# Patient Record
Sex: MALE | Race: WHITE | NOT HISPANIC OR LATINO | Employment: FULL TIME | URBAN - METROPOLITAN AREA
[De-identification: names, ages, dates, MRNs, and addresses within clinical notes are randomized per-mention and may not be internally consistent; named-entity substitution may affect disease eponyms.]

---

## 2021-10-28 ENCOUNTER — NURSE TRIAGE (OUTPATIENT)
Dept: NURSING | Facility: CLINIC | Age: 46
End: 2021-10-28

## 2021-10-28 ENCOUNTER — OFFICE VISIT (OUTPATIENT)
Dept: URGENT CARE | Facility: URGENT CARE | Age: 46
End: 2021-10-28
Payer: COMMERCIAL

## 2021-10-28 VITALS
HEART RATE: 73 BPM | TEMPERATURE: 98 F | DIASTOLIC BLOOD PRESSURE: 102 MMHG | SYSTOLIC BLOOD PRESSURE: 142 MMHG | OXYGEN SATURATION: 96 % | RESPIRATION RATE: 20 BRPM | WEIGHT: 145 LBS | HEIGHT: 69 IN | BODY MASS INDEX: 21.48 KG/M2

## 2021-10-28 DIAGNOSIS — M54.12 CERVICAL RADICULOPATHY: Primary | ICD-10-CM

## 2021-10-28 PROCEDURE — 99204 OFFICE O/P NEW MOD 45 MIN: CPT | Performed by: FAMILY MEDICINE

## 2021-10-28 RX ORDER — METHYLPREDNISOLONE 4 MG
TABLET, DOSE PACK ORAL
Qty: 21 TABLET | Refills: 0 | Status: SHIPPED | OUTPATIENT
Start: 2021-10-28 | End: 2022-08-17

## 2021-10-28 RX ORDER — CYCLOBENZAPRINE HCL 5 MG
5 TABLET ORAL
Qty: 30 TABLET | Refills: 0 | Status: SHIPPED | OUTPATIENT
Start: 2021-10-28 | End: 2022-08-17

## 2021-10-28 RX ORDER — IBUPROFEN 800 MG/1
800 TABLET, FILM COATED ORAL EVERY 8 HOURS PRN
Qty: 30 TABLET | Refills: 0 | Status: SHIPPED | OUTPATIENT
Start: 2021-10-28 | End: 2022-08-17

## 2021-10-28 ASSESSMENT — MIFFLIN-ST. JEOR: SCORE: 1538.1

## 2021-10-28 NOTE — LETTER
[facility] URGENT CARE [place]  [address] [place] [address]  Phone: [phone]    October 28, 2021        Teodora CARROLL Sanjuanamateobobdebbie  [address] [facility] [address]          To whom it may concern:    RE: Teodora Sanchez    Patient was seen and treated today at our clinic. Please excuse starting today and return to work tomorrow, 10/29/21.     Please contact me for questions or concerns.      Sincerely,        Deb Hilliard MD

## 2021-10-28 NOTE — LETTER
[facility] URGENT CARE [place]  [address] [place] [address]  Phone: [phone]    October 28, 2021        Teodora Sanchez  [address] [facility] [address]          To whom it may concern:    RE: Teodora Sanchez    Patient was seen and treated today at our clinic and missed work today.  Please excuse from work today.  He can return to work tomorrow, 10/29/21.     Please contact me for questions or concerns.      Sincerely,        Deb Hilliard MD

## 2021-10-28 NOTE — LETTER
[facility] URGENT CARE [place]  [address] [place] [address]  Phone: [phone]    October 28, 2021        Teodora Sanchez  [address] [facility] [address]          To whom it may concern:    RE: Teodora Sanchez    Patient was seen and treated today at our clinic. Please excuse from work on 10/29/21.  He can return to work on 10/30/21.      Please contact me for questions or concerns.      Sincerely,        Deb Hilliard MD

## 2021-10-28 NOTE — PATIENT INSTRUCTIONS
Start medrol does pack first dose now    Start cyclobenzaprine -muscle relaxant at bedtime will make you sleepy    For pain    Start  Ibuprofen (motrin/advil)  800 mg 3 times a day always take with food for the next 5 to 7 days until pain resolves-maximum dose of ibuprofen is 2400 mg in 24 hours     Can alternate with     Acetaminophen (Tylenol ) 1000 mg 3 times a day for the next 5 to 7 days until pain resolves-maximum dose of acetaminophen (tylenol)  is 3000 mg in 24-hours    Patient Education     Understanding Cervical Radiculopathy    Cervical radiculopathy is irritation or inflammation of a nerve root in the neck. It causes neck pain and other symptoms that may spread into the chest or down the arm. To understand this condition, it helps to understand the parts of the spine:    Vertebrae. These are bones that stack to form the spine. The cervical spine contains the 7 vertebrae in the neck.    Disks. These are soft pads of tissue between the vertebrae. They act as shock absorbers for the spine.    The spinal canal. This is a tunnel formed within the stacked vertebrae. The spinal cord runs through this canal.    Nerves. These branch off the spinal cord. As they leave the spinal canal, nerves pass through openings between the vertebrae. The nerve root is the part of the nerve that is closest to the spinal cord.   With cervical radiculopathy, nerve roots in the neck become irritated. This leads to pain and symptoms that can travel to the nerves that go from the spinal cord down the arms and into the torso.  What causes cervical radiculopathy?  Aging, injury, poor posture, and other issues can lead to problems in the neck. These problems may then irritate nerve roots. These include:    Damage to a disk in the cervical spine. The damaged disk may then press on nearby nerve roots.    Degeneration from wear and tear, and aging. This can lead to narrowing (stenosis) of the openings between the vertebrae. The narrowed  openings press on nerve roots as they leave the spinal canal.    An unstable spine. This is when a vertebra slips forward. It can then press on a nerve root.  There are other, less common causes of pressure on nerves in the neck. These include infection, cysts, and tumors.  Symptoms of cervical radiculopathy  These include:    Neck pain    Pain, numbness, tingling, or weakness that travels down the arm    Loss of neck movement    Muscle spasms  Treatment for cervical radiculopathy  In most cases, your healthcare provider will first try treatments that help relieve symptoms. These may include:    Prescription or over-the-counter pain medicines. These help relieve pain and swelling.    Cold packs. These help reduce pain.    Resting. This involves avoiding positions and activities that increase pain.    Neck brace (cervical collar). This can help relieve inflammation and pain.    Physical therapy, including exercises and stretches. This can help decrease pain and increase movement and function.    Shots of medicinesaround the nerve roots. This is done to help relieve symptoms for a time.  In some cases, your healthcare provider may advise surgery to fix the underlying problem. This depends on the cause, the symptoms, and how long the pain has lasted.  Possible complications  Over time, an irritated and inflamed nerve may become damaged. This may lead to long-lasting (permanent) numbness or weakness. If symptoms change suddenly or get worse, be sure to let your healthcare provider know.     When to call your healthcare provider  Call your healthcare provider right away if you have any of these:    New pain or pain that gets worse    New or increasing weakness, numbness, or tingling in your arm or hand    Bowel or bladder changes   [employer] last reviewed this educational content on 3/10/2016    1673-1150 [employer]. All rights reserved. This information is not intended as a substitute for professional  medical care. Always follow your healthcare professional's instructions.

## 2021-10-28 NOTE — TELEPHONE ENCOUNTER
RN Triage:    Was seen at [facility] urgent care this afternoon for ongoing cervical neck pain.  3 medications were prescribed.  When he got to the pharmacy later they said they were closed due to inadequate staff.  Home care discussed.  He plans to monitor at home tonight, taking ibuprofen as prescribed.  He will go back to pharmacy tomorrow for medications.    Gayle Orozco RN 10/28/21 5:21 PM  [facility] Nurse Advisor

## 2021-10-28 NOTE — PROGRESS NOTES
Patient presents with:  Urgent Care: Neck and shoulder pain has gotten worse last 2 days but had it for months       Subjective     Teodora Sanchez is a 46 year old male who presents to clinic today for the following health issues:    HPI     Musculoskeletal problem/pain      Duration: on going chronic neck pain and radicular pain into right>left shoulder    Description   Location: onset tin this last year, consistent low level 2/10 pain and into r shoulder initially,  and then comes and goes/has flares of pain 5-6/10 in the last 2 weeks now pain into both shoulders, tingles down bilateral arms into fingers, comes and goes, no weakness in legs, no loss of bowel/bladder control  no history of MRI no    Intensity:  moderate    Accompanying signs and symptoms: radiation of pain shoulder and hands as noted , tingling and numbness in hands    History  Previous similar problem: YES- as noted   Previous evaluation: Followed by [facility] history of MRI  or other imaging, physical therapy and chiropractic care  in the past has a TENS unit as well    Precipitating or alleviating factors:  Trauma or overuse: NO   Aggravating factors include: lifting and movement of the neck  In bed stomach sleeper, unable to sleep causes pain     Therapies tried and outcome: heat, ice, acetaminophen, Ibuprofen and home exercise TENS unit         No past medical history on file.  Social History     Tobacco Use     Smoking status: Current Every Day Smoker     Smokeless tobacco: Never Used   Substance Use Topics     Alcohol use: Not on file       Current Outpatient Medications   Medication Sig Dispense Refill     cyclobenzaprine (FLEXERIL) 5 MG tablet Take 1 tablet (5 mg) by mouth at bedtime as needed, may repeat once for muscle spasms 30 tablet 0     ibuprofen (ADVIL/MOTRIN) 800 MG tablet Take 1 tablet (800 mg) by mouth every 8 hours as needed for moderate pain 30 tablet 0     methylPREDNISolone (MEDROL DOSEPAK) 4 MG tablet  "therapy pack Follow Package Directions 21 tablet 0     No Known Allergies          ROS are negative, except as otherwise noted HPI      Objective    BP (!) 142/102   Pulse 73   Temp 98  F (36.7  C) (Oral)   Resp 20   Ht 1.753 m (5' 9\")   Wt 65.8 kg (145 lb)   SpO2 96%   BMI 21.41 kg/m    Body mass index is 21.41 kg/m .  Physical Exam   GENERAL: alert and no distress  NECK: no adenopathy, no asymmetry, masses, or scars and thyroid normal to palpation  MS: neck exam: impaired movement of neck, tenderness at bilateral paraspinous muscles, and upper border of trapezius bilateral,  range of motion: flexion mildly reduced, extension mildly reduced due to pain  lateral rotation right mildly reduced, lateral rotation to left mildly reduced due to pain , normal neurological exam of arms; normal DTR's, motor, sensory exam  NEURO: Alert, oriented, speech and mentation normal, Cranial nerves 2-12 are normal., Strength normal and symmetrical in upper and lower extremities., Reflexes 2+ and symmetrical at biceps, triceps, brachioradialis, knee, Sensation is normal throughout, Gait normal       Diagnostic Test Results:  Labs reviewed in Epic  No results found for any visits on 10/28/21.        ASSESSMENT/PLAN:      ICD-10-CM    1. Cervical radiculopathy  M54.12 methylPREDNISolone (MEDROL DOSEPAK) 4 MG tablet therapy pack     cyclobenzaprine (FLEXERIL) 5 MG tablet     ibuprofen (ADVIL/MOTRIN) 800 MG tablet             Reviewed medication instructions and side effects. Follow up if experiences side effects.     I reviewed supportive care, otc meds to use if needed, expected course, and signs of concern.  Follow up as needed or if he does not improve within  1-2 days or if worsens in any way.  Reviewed red flag symptoms and is to go to the ER if experiences any of these.     The use of Dragon/[employer] dictation services may have been used to construct the content in this note; any grammatical or spelling errors are " non-intentional. Please contact the author of this note directly if you are in need of any clarification.        On the day of the encounter, time spend on chart review, patient visit, review of testing, documentation and discussion with other providers was 45  minutes        Patient Instructions       Start medrol does pack first dose now    Start cyclobenzaprine -muscle relaxant at bedtime will make you sleepy    For pain    Start  Ibuprofen (motrin/advil)  800 mg 3 times a day always take with food for the next 5 to 7 days until pain resolves-maximum dose of ibuprofen is 2400 mg in 24 hours     Can alternate with     Acetaminophen (Tylenol ) 1000 mg 3 times a day for the next 5 to 7 days until pain resolves-maximum dose of acetaminophen (tylenol)  is 3000 mg in 24-hours    Patient Education     Understanding Cervical Radiculopathy    Cervical radiculopathy is irritation or inflammation of a nerve root in the neck. It causes neck pain and other symptoms that may spread into the chest or down the arm. To understand this condition, it helps to understand the parts of the spine:    Vertebrae. These are bones that stack to form the spine. The cervical spine contains the 7 vertebrae in the neck.    Disks. These are soft pads of tissue between the vertebrae. They act as shock absorbers for the spine.    The spinal canal. This is a tunnel formed within the stacked vertebrae. The spinal cord runs through this canal.    Nerves. These branch off the spinal cord. As they leave the spinal canal, nerves pass through openings between the vertebrae. The nerve root is the part of the nerve that is closest to the spinal cord.   With cervical radiculopathy, nerve roots in the neck become irritated. This leads to pain and symptoms that can travel to the nerves that go from the spinal cord down the arms and into the torso.  What causes cervical radiculopathy?  Aging, injury, poor posture, and other issues can lead to problems in the  neck. These problems may then irritate nerve roots. These include:    Damage to a disk in the cervical spine. The damaged disk may then press on nearby nerve roots.    Degeneration from wear and tear, and aging. This can lead to narrowing (stenosis) of the openings between the vertebrae. The narrowed openings press on nerve roots as they leave the spinal canal.    An unstable spine. This is when a vertebra slips forward. It can then press on a nerve root.  There are other, less common causes of pressure on nerves in the neck. These include infection, cysts, and tumors.  Symptoms of cervical radiculopathy  These include:    Neck pain    Pain, numbness, tingling, or weakness that travels down the arm    Loss of neck movement    Muscle spasms  Treatment for cervical radiculopathy  In most cases, your healthcare provider will first try treatments that help relieve symptoms. These may include:    Prescription or over-the-counter pain medicines. These help relieve pain and swelling.    Cold packs. These help reduce pain.    Resting. This involves avoiding positions and activities that increase pain.    Neck brace (cervical collar). This can help relieve inflammation and pain.    Physical therapy, including exercises and stretches. This can help decrease pain and increase movement and function.    Shots of medicinesaround the nerve roots. This is done to help relieve symptoms for a time.  In some cases, your healthcare provider may advise surgery to fix the underlying problem. This depends on the cause, the symptoms, and how long the pain has lasted.  Possible complications  Over time, an irritated and inflamed nerve may become damaged. This may lead to long-lasting (permanent) numbness or weakness. If symptoms change suddenly or get worse, be sure to let your healthcare provider know.     When to call your healthcare provider  Call your healthcare provider right away if you have any of these:    New pain or pain that gets  worse    New or increasing weakness, numbness, or tingling in your arm or hand    Bowel or bladder changes   [employer] last reviewed this educational content on 3/10/2016    3942-8040 [employer]. All rights reserved. This information is not intended as a substitute for professional medical care. Always follow your healthcare professional's instructions.

## 2022-08-17 ENCOUNTER — OFFICE VISIT (OUTPATIENT)
Dept: URGENT CARE | Facility: URGENT CARE | Age: 47
End: 2022-08-17
Payer: COMMERCIAL

## 2022-08-17 VITALS
HEART RATE: 77 BPM | TEMPERATURE: 97.9 F | OXYGEN SATURATION: 99 % | SYSTOLIC BLOOD PRESSURE: 131 MMHG | DIASTOLIC BLOOD PRESSURE: 90 MMHG

## 2022-08-17 DIAGNOSIS — Z82.49 FAMILY HISTORY OF DVT: ICD-10-CM

## 2022-08-17 DIAGNOSIS — M25.571 ACUTE RIGHT ANKLE PAIN: ICD-10-CM

## 2022-08-17 DIAGNOSIS — M25.471 EDEMA OF RIGHT ANKLE: Primary | ICD-10-CM

## 2022-08-17 PROBLEM — M54.12 CERVICAL RADICULOPATHY: Status: ACTIVE | Noted: 2021-01-07

## 2022-08-17 PROBLEM — F19.10 POLYSUBSTANCE ABUSE (H): Status: ACTIVE | Noted: 2022-08-17

## 2022-08-17 PROBLEM — F32.A DEPRESSION: Status: ACTIVE | Noted: 2022-08-17

## 2022-08-17 PROBLEM — K43.9 VENTRAL HERNIA WITHOUT OBSTRUCTION OR GANGRENE: Status: ACTIVE | Noted: 2019-06-14

## 2022-08-17 PROCEDURE — 85379 FIBRIN DEGRADATION QUANT: CPT | Performed by: NURSE PRACTITIONER

## 2022-08-17 PROCEDURE — 36415 COLL VENOUS BLD VENIPUNCTURE: CPT | Performed by: NURSE PRACTITIONER

## 2022-08-17 PROCEDURE — 99214 OFFICE O/P EST MOD 30 MIN: CPT | Performed by: NURSE PRACTITIONER

## 2022-08-17 NOTE — PATIENT INSTRUCTIONS
Wear boot until feeling better.    Limit weightbearing over the next 24 hours.    Apply ice to the swollen part of your ankle over top of a washcloth for 20 minutes 2-3 times a day.    Ibuprofen 600 mg every 6 hours as needed for pain.  Please be sure to take this with food.

## 2022-08-17 NOTE — PROGRESS NOTES
Chief Complaint   Patient presents with     Musculoskeletal Problem     About a week ago, pt started getting pain in rt foot and ankle, was bruised but its gone, numbness going up his leg a little bit, foot is still swollen, feels tingly, it was hard to walk on and is still painful but not as bad, pain has gone down unless twisted a certain way         ICD-10-CM    1. Edema of right ankle  M25.471 SLIMLINE CAST BOOT M     D dimer, quantitative   2. Acute right ankle pain  M25.571 XR Ankle Right G/E 3 Views     SLIMLINE CAST BOOT M   3. Family history of DVT  Z82.49 D dimer, quantitative   Instructed patient to wear walking boot until symptoms are feeling improved.  Ice, elevation, ibuprofen as needed pain.    31 minutes spent on the date of the encounter doing chart review, history and exam, documentation and further activities per the note    Right ankle x-ray shows no acute fracture or dislocation.  Soft tissue swelling is noted along the lateral edge of the malleolus.  Subjective     Behzad Sanchez is an 47 year old male who presents to clinic today for pain in the right ankle and foot since about a week ago.  Noticed it swelling at that time but did not recall any type of injury.  Pain has not been improving and swelling has not been going down so he presents for evaluation.  He denies any previous injury to this area.        Objective    BP (!) 131/90 (BP Location: Right arm, Patient Position: Sitting, Cuff Size: Adult Regular)   Pulse 77   Temp 97.9  F (36.6  C) (Temporal)   SpO2 99%   Nurses notes and VS have been reviewed.    Physical Exam       GENERAL APPEARANCE: healthy appearing, alert     MS: extremities normal- no gross deformities noted; normal muscle tone, except for swelling over the right lateral malleolus with tenderness in the same area.     SKIN: no suspicious lesions or rashes     NEURO: Normal strength and tone, mentation intact and speech normal     PSYCH: normal thought process;  no significant mood disturbance    Patient Instructions   Wear boot until feeling better.    Limit weightbearing over the next 24 hours.    Apply ice to the swollen part of your ankle over top of a washcloth for 20 minutes 2-3 times a day.    Ibuprofen 600 mg every 6 hours as needed for pain.  Please be sure to take this with food.      YELENA Giron, CNP  [facility] Provider    The use of Dragon/[employer] dictation services may have been used to construct the content in this note; any grammatical or spelling errors are non-intentional. Please contact the author of this note directly if you are in need of any clarification.

## 2022-08-17 NOTE — LETTER
August 17, 2022      Behzad Singletarybobdebbie  [address]  [facility] [address]        To Whom It May Concern:    Behzad Sanchez  was seen on 8/17/2022.  Please excuse him  until 8/19/2022 due to injury.        Sincerely,        Anastasia Chisholm, CNP

## 2022-08-18 LAB — D DIMER PPP FEU-MCNC: 1.64 UG/ML FEU (ref 0–0.5)

## 2024-10-13 ENCOUNTER — HEALTH MAINTENANCE LETTER (OUTPATIENT)
Age: 49
End: 2024-10-13